# Patient Record
Sex: FEMALE | Race: WHITE
[De-identification: names, ages, dates, MRNs, and addresses within clinical notes are randomized per-mention and may not be internally consistent; named-entity substitution may affect disease eponyms.]

---

## 2019-04-23 ENCOUNTER — HOSPITAL ENCOUNTER (EMERGENCY)
Dept: HOSPITAL 33 - ED | Age: 34
LOS: 1 days | Discharge: HOME | End: 2019-04-24
Payer: COMMERCIAL

## 2019-04-23 DIAGNOSIS — N83.201: ICD-10-CM

## 2019-04-23 DIAGNOSIS — R10.9: Primary | ICD-10-CM

## 2019-04-23 LAB
ALBUMIN SERPL-MCNC: 5.2 G/DL (ref 3.5–5)
ALP SERPL-CCNC: 79 U/L (ref 38–126)
ALT SERPL-CCNC: 39 U/L (ref 0–35)
AMYLASE SERPL-CCNC: 142 U/L (ref 30–110)
ANION GAP SERPL CALC-SCNC: 18.1 MEQ/L (ref 5–15)
AST SERPL QL: 37 U/L (ref 14–36)
BASOPHILS # BLD AUTO: 0.03 10*3/UL (ref 0–0.4)
BASOPHILS NFR BLD AUTO: 0.4 % (ref 0–0.4)
BILIRUB BLD-MCNC: 0.6 MG/DL (ref 0.2–1.3)
BUN SERPL-MCNC: 11 MG/DL (ref 7–17)
CALCIUM SPEC-MCNC: 10.5 MG/DL (ref 8.4–10.2)
CHLORIDE SERPL-SCNC: 100 MMOL/L (ref 98–107)
CO2 SERPL-SCNC: 28 MMOL/L (ref 22–30)
CREAT SERPL-MCNC: 0.56 MG/DL (ref 0.52–1.04)
EOSINOPHIL # BLD AUTO: 0.17 10*3/UL (ref 0–0.5)
GLUCOSE SERPL-MCNC: 95 MG/DL (ref 74–106)
GLUCOSE UR-MCNC: NEGATIVE MG/DL
GRANULOCYTES # BLD AUTO: 4.61 10*3/UL (ref 1.4–6.9)
HCT VFR BLD AUTO: 39.6 % (ref 35–47)
HGB BLD-MCNC: 13.1 GM/DL (ref 12–16)
LIPASE SERPL-CCNC: 372 U/L (ref 23–300)
LYMPHOCYTES # SPEC AUTO: 2.99 10*3/UL (ref 1–4.6)
MCH RBC QN AUTO: 29.2 PG (ref 26–32)
MCHC RBC AUTO-ENTMCNC: 33.1 G/DL (ref 32–36)
MONOCYTES # BLD AUTO: 0.71 10*3/UL (ref 0–1.3)
NEUTROPHILS NFR BLD AUTO: 54.2 % (ref 36–66)
PLATELET # BLD AUTO: 365 K/MM3 (ref 150–450)
POTASSIUM SERPLBLD-SCNC: 3.7 MMOL/L (ref 3.5–5.1)
PROT SERPL-MCNC: 9.1 G/DL (ref 6.3–8.2)
PROT UR STRIP-MCNC: NEGATIVE MG/DL
RBC # BLD AUTO: 4.48 M/MM3 (ref 4.1–5.4)
RBC #/AREA URNS HPF: (no result) /HPF (ref 0–2)
SODIUM SERPL-SCNC: 142 MMOL/L (ref 137–145)
WBC # BLD AUTO: 8.5 K/MM3 (ref 4–10.5)
WBC #/AREA URNS HPF: (no result) /HPF (ref 0–5)

## 2019-04-23 PROCEDURE — 85025 COMPLETE CBC W/AUTO DIFF WBC: CPT

## 2019-04-23 PROCEDURE — 81001 URINALYSIS AUTO W/SCOPE: CPT

## 2019-04-23 PROCEDURE — 80053 COMPREHEN METABOLIC PANEL: CPT

## 2019-04-23 PROCEDURE — 96360 HYDRATION IV INFUSION INIT: CPT

## 2019-04-23 PROCEDURE — 74177 CT ABD & PELVIS W/CONTRAST: CPT

## 2019-04-23 PROCEDURE — 83605 ASSAY OF LACTIC ACID: CPT

## 2019-04-23 PROCEDURE — 36000 PLACE NEEDLE IN VEIN: CPT

## 2019-04-23 PROCEDURE — 84703 CHORIONIC GONADOTROPIN ASSAY: CPT

## 2019-04-23 PROCEDURE — 36415 COLL VENOUS BLD VENIPUNCTURE: CPT

## 2019-04-23 PROCEDURE — 96374 THER/PROPH/DIAG INJ IV PUSH: CPT

## 2019-04-23 PROCEDURE — 82150 ASSAY OF AMYLASE: CPT

## 2019-04-23 PROCEDURE — 83690 ASSAY OF LIPASE: CPT

## 2019-04-23 PROCEDURE — 99284 EMERGENCY DEPT VISIT MOD MDM: CPT

## 2019-04-23 PROCEDURE — 96375 TX/PRO/DX INJ NEW DRUG ADDON: CPT

## 2019-04-23 NOTE — ERPHSYRPT
- History of Present Illness


Time Seen by Provider: 04/23/19 22:40


Historian: patient, family


Exam Limitations: no limitations


Physician History: 





33 y/o [place] female presents with first right flank pain for 2 days then 

over last 24 hours pain rlq with assoc nausea. no vomiting and no diarrhea. no 

vaginal discharge. last menstrual period ended 1 week ago. no prior abd 

surgeries


Timing/Duration: day(s) (2)


Quality: sharpness, stabbing


Abdominal Pain Onset Location: RLQ, flank (right)


Severity of Pain-Max: moderate


Severity of Pain-Current: moderate


Modifying Factors: Worsens With: coughing, palpation, vomiting, walking


Associated Symptoms: nausea, No diarrhea, No fever/chills, No vomiting


Previous symptoms: no prior history


Allergies/Adverse Reactions: 








No Known Drug Allergies Allergy (Unverified 04/23/19 22:50)


 








- Review of Systems


Constitutional: No Symptoms


Eyes: No Symptoms


Ears, Nose, & Throat: No Symptoms


Respiratory: No Symptoms


Cardiac: No Symptoms


Abdominal/Gastrointestinal: Abdominal Pain, Nausea, No Vomiting, No Diarrhea


Genitourinary Symptoms: No Symptoms, No Dysuria, No Frequency, No Hematuria


Musculoskeletal: No Symptoms


Skin: No Symptoms


Neurological: No Symptoms


Psychological: No Symptoms


Endocrine: No Symptoms


Hematologic/Lymphatic: No Symptoms


Immunological/Allergic: No Symptoms


All Other Systems: Reviewed and Negative





- Past Medical History


Neurological History: No Pertinent History


ENT History: No Pertinent History


Cardiac History: No Pertinent History


Respiratory History: No Pertinent History


Endocrine Medical History: No Pertinent History


Musculoskeletal History: No Pertinent History


GI Medical History: No Pertinent History


 History: No Pertinent History


Psycho-Social History: No Pertinent History


Female Reproductive Disorders: No Pertinent History





- Past Surgical History


Neuro Surgical History: No Pertinent History


Cardiac: No Pertinent History


Respiratory: No Pertinent History


Gastrointestinal: No Pertinent History


Genitourinary: No Pertinent History


Musculoskeletal: No Pertinent History


Female Surgical History: No Pertinent History





- Nursing Vital Signs


Nursing Vital Signs: 


 Initial Vital Signs











Temperature  98.1 F   04/23/19 22:51


 


Pulse Rate  68   04/23/19 22:51


 


Respiratory Rate  18   04/23/19 22:51


 


Blood Pressure  133/84   04/23/19 22:51


 


O2 Sat by Pulse Oximetry  100   04/23/19 22:51








 Pain Scale











Pain Intensity                 7

















- Physical Exam


General Appearance: no apparent distress, alert, anxiety


Eye Exam: PERRL/EOMI


Ears, Nose, Throat Exam: normal ENT inspection, moist mucous membranes


Neck Exam: normal inspection, non-tender, supple, full range of motion


Respiratory Exam: normal breath sounds, lungs clear, airway intact, No chest 

tenderness, No respiratory distress


Cardiovascular Exam: regular rate/rhythm, normal heart sounds, normal 

peripheral pulses


Gastrointestinal/Abdomen Exam: soft, normal bowel sounds, tenderness (right 

lower quadrant), guarding (mild), No rebound


Pelvic Exam: not done


Rectal Exam: not done


Back Exam: normal inspection, normal range of motion, No CVA tenderness, No 

vertebral tenderness


Extremity Exam: normal inspection, normal range of motion, pelvis stable


Neurologic Exam: alert, oriented x 3, cooperative, CNs II-XII nml as tested


Skin Exam: normal color, warm, dry


Lymphatic Exam: No adenopathy


**SpO2 Interpretation**: normal


O2 Delivery: Room Air


Ordered Tests: 


 Active Orders 24 hr











 Category Date Time Status


 


 IV Insertion STAT Care  04/23/19 23:07 Active


 


 ABDOMEN AND PELVIS W CONTRAST [CT] Stat Exams  04/23/19 23:50 Taken


 


 AMYLASE Stat Lab  04/23/19 23:18 Completed


 


 CBC W DIFF Stat Lab  04/23/19 23:18 Completed


 


 CMP Stat Lab  04/23/19 23:18 Completed


 


 HCG,QUALITATIVE URINE Stat Lab  04/23/19 23:18 Completed


 


 LIPASE Stat Lab  04/23/19 23:18 Completed


 


 Lactic Acid Stat Lab  04/23/19 23:07 Completed


 


 UA W/RFX UR CULTURE Stat Lab  04/23/19 23:18 Completed








Medication Summary














Discontinued Medications














Generic Name Dose Route Start Last Admin





  Trade Name Jazmine  PRN Reason Stop Dose Admin


 


Hydrocodone Bitart/Acetaminophen  Confirm  04/23/19 23:34  





  Norco 5/325 Mg***  Administered  04/23/19 23:35  





  Dose   





  1 tab   





  .ROUTE   





  .STK-MED ONE   





     





     





     





     


 


Hydrocodone Bitart/Acetaminophen  1 tab  04/23/19 23:36  04/23/19 23:45





  Norco 5/325 Mg***  PO  04/23/19 23:37  1 tab





  STAT ONE   Administration





     





     





     





     


 


Hydroxyzine HCl  25 mg  04/23/19 23:24  04/23/19 23:36





  Atarax 25 Mg***  PO  04/23/19 23:25  25 mg





  STAT ONE   Administration





     





     





     





     


 


Hydroxyzine HCl  Confirm  04/23/19 23:23  





  Atarax 25 Mg***  Administered  04/23/19 23:24  





  Dose   





  25 mg   





  .ROUTE   





  .STK-MED ONE   





     





     





     





     


 


Sodium Chloride  1,000 mls @ 999 mls/hr  04/23/19 23:07  04/24/19 01:43





  Sodium Chloride 0.9% 1000 Ml  IV  04/24/19 00:07  Infused





  .Q1H1M STA   Infusion





     





     





     





     


 


Sodium Chloride  Confirm  04/23/19 23:58  





  Sodium Chloride 0.9% 1000 Ml  Administered  04/23/19 23:59  





  Dose   





  1,000 mls @ ud   





  .ROUTE   





  .STK-MED ONE   





     





     





     





     


 


Lorazepam  0.5 mg  04/23/19 23:51  04/24/19 00:07





  Ativan 2 Mg/1 Ml Vial***  IV  04/23/19 23:52  0.5 mg





  STAT ONE   Administration





     





     





     





     


 


Lorazepam  Confirm  04/23/19 23:58  





  Ativan 2 Mg/1 Ml Vial***  Administered  04/23/19 23:59  





  Dose   





  2 mg   





  .ROUTE   





  .STK-MED ONE   





     





     





     





     


 


Ondansetron HCl  4 mg  04/23/19 23:07  04/24/19 00:04





  Zofran 4 Mg/2 Ml Vial**  IV  04/23/19 23:08  4 mg





  STAT ONE   Administration





     





     





     





     


 


Ondansetron HCl  Confirm  04/23/19 23:58  





  Zofran 4 Mg/2 Ml Vial**  Administered  04/23/19 23:59  





  Dose   





  4 mg   





  .ROUTE   





  .STK-MED ONE   





     





     





     





     











Lab/Rad Data: 


 Laboratory Result Diagrams





 04/23/19 23:18 





 04/23/19 23:18 





 Laboratory Results











  04/23/19 04/23/19 04/23/19 Range/Units





  23:18 23:18 23:18 


 


WBC     (4.0-10.5)  K/mm3


 


RBC     (4.1-5.4)  M/mm3


 


Hgb     (12.0-16.0)  gm/dl


 


Hct     (35-47)  %


 


MCV     ()  fl


 


MCH     (26-32)  pg


 


MCHC     (32-36)  g/dl


 


RDW     (11.5-14.0)  %


 


Plt Count     (150-450)  K/mm3


 


MPV     (6-9.5)  fl


 


Gran %     (36.0-66.0)  %


 


Eos # (Auto)     (0-0.5)  


 


Absolute Lymphs (auto)     (1.0-4.6)  


 


Absolute Monos (auto)     (0.0-1.3)  


 


Lymphocytes %     (24.0-44.0)  %


 


Monocytes %     (0.0-12.0)  %


 


Eosinophils %     (0.00-5.0)  %


 


Basophils %     (0.0-0.4)  %


 


Absolute Granulocytes     (1.4-6.9)  


 


Basophils #     (0-0.4)  


 


Sodium    142  (137-145)  mmol/L


 


Potassium    3.7  (3.5-5.1)  mmol/L


 


Chloride    100  ()  mmol/L


 


Carbon Dioxide    28  (22-30)  mmol/L


 


Anion Gap    18.1 H  (5-15)  MEQ/L


 


BUN    11  (7-17)  mg/dL


 


Creatinine    0.56  (0.52-1.04)  mg/dL


 


Estimated GFR    > 60.0  ML/MIN


 


Glucose    95  ()  mg/dL


 


Lactic Acid     (0.4-2.0)  


 


Calcium    10.5 H  (8.4-10.2)  mg/dL


 


Total Bilirubin    0.60  (0.2-1.3)  mg/dL


 


AST    37 H  (14-36)  U/L


 


ALT    39 H  (0-35)  U/L


 


Alkaline Phosphatase    79  ()  U/L


 


Serum Total Protein    9.1 H  (6.3-8.2)  g/dL


 


Albumin    5.2 H  (3.5-5.0)  g/dL


 


Amylase    142 H  ()  U/L


 


Lipase    372 H  ()  U/L


 


Urine Color   COLORLESS   (YELLOW)  


 


Urine Appearance   CLEAR   (CLEAR)  


 


Urine pH   7.0   (5-6)  


 


Ur Specific Gravity   1.002   (1.005-1.025)  


 


Urine Protein   NEGATIVE   (Negative)  


 


Urine Ketones   NEGATIVE   (NEGATIVE)  


 


Urine Blood   SMALL   (0-5)  Richy/ul


 


Urine Nitrite   NEGATIVE   (NEGATIVE)  


 


Urine Bilirubin   NEGATIVE   (NEGATIVE)  


 


Urine Urobilinogen   NEGATIVE   (0-1)  mg/dL


 


Ur Leukocyte Esterase   NEGATIVE   (NEGATIVE)  


 


Urine WBC (Auto)   NONE   (0-5)  /HPF


 


Urine RBC (Auto)   NONE   (0-2)  /HPF


 


U Epithel Cells (Auto)   NONE   (FEW)  /HPF


 


Urine Bacteria (Auto)   NONE   (NEGATIVE)  /HPF


 


Urine Mucus (Auto)   SLIGHT   (NEGATIVE)  /HPF


 


Urine Culture Reflexed   NO   (NO)  


 


Urine Glucose   NEGATIVE   (NEGATIVE)  mg/dL


 


Urine HCG, Qual  NEGATIVE    (Negative)  














  04/23/19 04/23/19 Range/Units





  23:18 23:07 


 


WBC  8.5   (4.0-10.5)  K/mm3


 


RBC  4.48   (4.1-5.4)  M/mm3


 


Hgb  13.1   (12.0-16.0)  gm/dl


 


Hct  39.6   (35-47)  %


 


MCV  88.4   ()  fl


 


MCH  29.2   (26-32)  pg


 


MCHC  33.1   (32-36)  g/dl


 


RDW  13.4   (11.5-14.0)  %


 


Plt Count  365   (150-450)  K/mm3


 


MPV  10.0 H   (6-9.5)  fl


 


Gran %  54.2   (36.0-66.0)  %


 


Eos # (Auto)  0.17   (0-0.5)  


 


Absolute Lymphs (auto)  2.99   (1.0-4.6)  


 


Absolute Monos (auto)  0.71   (0.0-1.3)  


 


Lymphocytes %  35.1   (24.0-44.0)  %


 


Monocytes %  8.3   (0.0-12.0)  %


 


Eosinophils %  2.0   (0.00-5.0)  %


 


Basophils %  0.4   (0.0-0.4)  %


 


Absolute Granulocytes  4.61   (1.4-6.9)  


 


Basophils #  0.03   (0-0.4)  


 


Sodium    (137-145)  mmol/L


 


Potassium    (3.5-5.1)  mmol/L


 


Chloride    ()  mmol/L


 


Carbon Dioxide    (22-30)  mmol/L


 


Anion Gap    (5-15)  MEQ/L


 


BUN    (7-17)  mg/dL


 


Creatinine    (0.52-1.04)  mg/dL


 


Estimated GFR    ML/MIN


 


Glucose    ()  mg/dL


 


Lactic Acid   1.1  (0.4-2.0)  


 


Calcium    (8.4-10.2)  mg/dL


 


Total Bilirubin    (0.2-1.3)  mg/dL


 


AST    (14-36)  U/L


 


ALT    (0-35)  U/L


 


Alkaline Phosphatase    ()  U/L


 


Serum Total Protein    (6.3-8.2)  g/dL


 


Albumin    (3.5-5.0)  g/dL


 


Amylase    ()  U/L


 


Lipase    ()  U/L


 


Urine Color    (YELLOW)  


 


Urine Appearance    (CLEAR)  


 


Urine pH    (5-6)  


 


Ur Specific Gravity    (1.005-1.025)  


 


Urine Protein    (Negative)  


 


Urine Ketones    (NEGATIVE)  


 


Urine Blood    (0-5)  Richy/ul


 


Urine Nitrite    (NEGATIVE)  


 


Urine Bilirubin    (NEGATIVE)  


 


Urine Urobilinogen    (0-1)  mg/dL


 


Ur Leukocyte Esterase    (NEGATIVE)  


 


Urine WBC (Auto)    (0-5)  /HPF


 


Urine RBC (Auto)    (0-2)  /HPF


 


U Epithel Cells (Auto)    (FEW)  /HPF


 


Urine Bacteria (Auto)    (NEGATIVE)  /HPF


 


Urine Mucus (Auto)    (NEGATIVE)  /HPF


 


Urine Culture Reflexed    (NO)  


 


Urine Glucose    (NEGATIVE)  mg/dL


 


Urine HCG, Qual    (Negative)  














- Progress


Progress: improved, re-examined


Progress Note: 





04/24/19 02:37


ct scan- right ovarian cyst


Counseled pt/family regarding: lab results, diagnosis, need for follow-up, rad 

results





- Departure


Departure Disposition: Home


Clinical Impression: 


 Abdominal pain, Right ovarian cyst





Condition: Stable


Critical Care Time: No


Referrals: 


DOCTOR,NO FAMILY [Primary Care Provider] - 


Additional Instructions: 


use tylenol and ibuprofen for pain

## 2019-04-24 VITALS — SYSTOLIC BLOOD PRESSURE: 123 MMHG | DIASTOLIC BLOOD PRESSURE: 66 MMHG

## 2019-04-24 VITALS — OXYGEN SATURATION: 98 % | HEART RATE: 68 BPM

## 2019-04-24 NOTE — XRAY
Indication: Right lower quadrant pain and nausea.



Multiple contiguous axial images obtained through the abdomen and pelvis using

80 cc Isovue 370 contrast only.



Comparison: None.



Lung bases demonstrates minimal bibasilar dependent atelectasis.  No

infiltrate or effusion.  Heart is not enlarged.



Noncontrasted stomach and bowel loops appear nonobstructed.  Normal appendix.

4.2 cm right ovary cyst.  Tiny right adnexal free fluid presumed from

rupture/leaking cyst.  No free air.  IUD in situ.  Both kidneys enhance and

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



Remaining liver, gallbladder, pancreas, spleen, adrenal glands, kidneys,

ureters, bladder, uterus, and aorta appear unremarkable.



Osseous structures intact.



Impression:

1.  4.2 cm right ovary cyst with tiny free fluid presumed from rupture/leaking

cyst.

2.  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

3.  Remaining CT abdomen/pelvis with contrast exam is negative.



Comment: Preliminary interpretation was made by [facility].  No discrepancy.



CT DI 10.97